# Patient Record
Sex: FEMALE | Race: WHITE | URBAN - METROPOLITAN AREA
[De-identification: names, ages, dates, MRNs, and addresses within clinical notes are randomized per-mention and may not be internally consistent; named-entity substitution may affect disease eponyms.]

---

## 2023-09-30 DIAGNOSIS — F41.9 ANXIETY: ICD-10-CM

## 2023-09-30 PROBLEM — E88.89 CYP2C19 INTERMEDIATE METABOLIZER (MULTI): Status: ACTIVE | Noted: 2022-10-10

## 2023-09-30 PROBLEM — G40.319: Status: ACTIVE | Noted: 2018-12-11

## 2023-09-30 PROBLEM — R46.89 COMPULSIVE BEHAVIOR: Status: ACTIVE | Noted: 2022-10-10

## 2023-09-30 PROBLEM — G40.812 LENNOX-GASTAUT SYNDROME (MULTI): Status: ACTIVE | Noted: 2018-12-11

## 2023-09-30 PROBLEM — F63.9 IMPULSE CONTROL DISORDER: Status: ACTIVE | Noted: 2022-10-10

## 2023-09-30 RX ORDER — DIAZEPAM 5 MG/1
5 TABLET ORAL EVERY 12 HOURS PRN
COMMUNITY
Start: 2022-02-07 | End: 2023-10-07

## 2023-09-30 RX ORDER — RUFINAMIDE 400 MG/1
2 TABLET, FILM COATED ORAL
COMMUNITY
Start: 2022-03-05 | End: 2024-05-10

## 2023-09-30 RX ORDER — ESCITALOPRAM OXALATE 5 MG/1
5 TABLET ORAL 2 TIMES DAILY
COMMUNITY
End: 2024-01-11

## 2023-09-30 RX ORDER — ETHOSUXIMIDE 250 MG/1
500 CAPSULE ORAL 2 TIMES DAILY
COMMUNITY

## 2023-09-30 RX ORDER — PHENYTOIN 50 MG/1
TABLET, CHEWABLE ORAL
COMMUNITY
Start: 2022-03-05

## 2023-09-30 RX ORDER — MIDAZOLAM 5 MG/.1ML
SPRAY NASAL
COMMUNITY
Start: 2021-01-18

## 2023-09-30 SDOH — ECONOMIC STABILITY: INCOME INSECURITY: IN THE LAST 12 MONTHS, WAS THERE A TIME WHEN YOU WERE NOT ABLE TO PAY THE MORTGAGE OR RENT ON TIME?: NO

## 2023-09-30 SDOH — ECONOMIC STABILITY: HOUSING INSECURITY
IN THE LAST 12 MONTHS, WAS THERE A TIME WHEN YOU DID NOT HAVE A STEADY PLACE TO SLEEP OR SLEPT IN A SHELTER (INCLUDING NOW)?: NO

## 2023-09-30 ASSESSMENT — LIFESTYLE VARIABLES
HOW OFTEN DO YOU HAVE SIX OR MORE DRINKS ON ONE OCCASION: NEVER
HOW OFTEN DO YOU HAVE A DRINK CONTAINING ALCOHOL: NEVER
HOW MANY STANDARD DRINKS CONTAINING ALCOHOL DO YOU HAVE ON A TYPICAL DAY: PATIENT DOES NOT DRINK
SKIP TO QUESTIONS 9-10: 1
AUDIT-C TOTAL SCORE: 0

## 2023-10-29 DIAGNOSIS — F79 INTELLECTUAL DISABILITY: ICD-10-CM

## 2023-11-01 ENCOUNTER — APPOINTMENT (OUTPATIENT)
Dept: BEHAVIORAL HEALTH | Facility: CLINIC | Age: 28
End: 2023-11-01
Payer: COMMERCIAL

## 2024-01-11 DIAGNOSIS — R46.89 COMPULSIVE BEHAVIOR: ICD-10-CM

## 2024-01-11 DIAGNOSIS — F41.9 ANXIETY: ICD-10-CM

## 2024-01-11 DIAGNOSIS — F63.9 IMPULSE CONTROL DISORDER: ICD-10-CM

## 2024-01-11 RX ORDER — ESCITALOPRAM OXALATE 5 MG/1
5 TABLET ORAL 2 TIMES DAILY
Qty: 180 TABLET | Refills: 0 | Status: SHIPPED | OUTPATIENT
Start: 2024-01-11 | End: 2024-01-29 | Stop reason: SDUPTHER

## 2024-01-29 DIAGNOSIS — R46.89 COMPULSIVE BEHAVIOR: ICD-10-CM

## 2024-01-29 DIAGNOSIS — F41.9 ANXIETY: ICD-10-CM

## 2024-01-29 DIAGNOSIS — F63.9 IMPULSE CONTROL DISORDER: ICD-10-CM

## 2024-01-29 RX ORDER — ESCITALOPRAM OXALATE 5 MG/1
5 TABLET ORAL 2 TIMES DAILY
Qty: 180 TABLET | Refills: 0 | Status: SHIPPED | OUTPATIENT
Start: 2024-01-29

## 2024-08-09 ENCOUNTER — OFFICE VISIT (OUTPATIENT)
Dept: BEHAVIORAL HEALTH | Facility: CLINIC | Age: 29
End: 2024-08-09
Payer: COMMERCIAL

## 2024-08-09 DIAGNOSIS — F79 INTELLECTUAL DISABILITY: ICD-10-CM

## 2024-08-09 DIAGNOSIS — R46.89 COMPULSIVE BEHAVIOR: ICD-10-CM

## 2024-08-09 DIAGNOSIS — F63.9 IMPULSE CONTROL DISORDER: Primary | ICD-10-CM

## 2024-08-09 DIAGNOSIS — G40.814 INTRACTABLE LENNOX-GASTAUT SYNDROME WITHOUT STATUS EPILEPTICUS (MULTI): ICD-10-CM

## 2024-08-09 DIAGNOSIS — G40.319 GENERALIZED IDIOPATHIC EPILEPSY AND EPILEPTIC SYNDROMES, INTRACTABLE, WITHOUT STATUS EPILEPTICUS (MULTI): ICD-10-CM

## 2024-08-09 DIAGNOSIS — F41.9 ANXIETY: ICD-10-CM

## 2024-08-09 PROCEDURE — 1036F TOBACCO NON-USER: CPT | Performed by: PSYCHIATRY & NEUROLOGY

## 2024-08-09 PROCEDURE — 99214 OFFICE O/P EST MOD 30 MIN: CPT | Performed by: PSYCHIATRY & NEUROLOGY

## 2024-08-09 NOTE — PROGRESS NOTES
Outpatient Adult IDD Psychiatry    A HIPAA-compliant interactive audio and video telecommunication system which permits real time communications between the patient (at the originating site) and provider (at the distant site) was utilized to provide this telehealth service.     The patient, family, caregivers and guardian (as appropriate) have provided consent on this date to conduct treatment via this telehealth service.  The patient's identity and physical location were verified at the time of this visit.      Present for appointment: Danielle and parents.    SUBJECTIVE    No major changes for Danielle in terms of overall health, seizures, or behaviors over the past several months.     She continues to have periods of moodiness/irritability and emotional tantrums.    Parents have not been successful in getting her up to the lab for checking serum escitalopram level -- inquiring about in-home phlebotomy.    Saw neurology (Dr. Garza) on 7/02/24 -- no treatment changes.  Current ASMs are: /50/100, /500, and /800.    Review of Systems   Constitutional:  Negative for activity change, appetite change and fatigue.   Cardiovascular:  Negative for chest pain.   Gastrointestinal:  Negative for abdominal pain.   Genitourinary:  Negative for dysuria and pelvic pain.   Musculoskeletal:  Negative for gait problem.   Neurological:  Positive for seizures. Negative for tremors and syncope.   Psychiatric/Behavioral:  Positive for behavioral problems. Negative for self-injury and sleep disturbance.       Controlled Substance Evaluation  [place]/PDMP reviewed: Skinny Lloyd MD on 8/9/2024 10:17 AM  I have personally reviewed the [place]AR[place] report for Danielle Moreira.     MEDICATION HISTORY  CBD supplement - led to subtherapeutic PHY levels and increased seizures  Clonazepam - for seizures  Depakote - for seizures, caused hair loss  Fluvoxamine - helpful for anxiety and OCD at 25mg per day  Lamotrigine - for  seizures  Keppra - for seizures, contributed to anger/aggression problems  Ketogenic diet - minimal improvement in seizure control  Oxcarbazepine - for seizures  Vimpat - for seizures  Zonisamide - caused abnormal behavior or tactile hallucinations    CURRENT MEDICATIONS    Current Outpatient Medications:     diazePAM (Valium) 5 mg tablet, Take 1 tablet (5 mg) by mouth every 12 hours if needed., Disp: , Rfl:     Dilantin Infatabs 50 mg chewable tablet, TAKE 2 TABLETS BY MOUTH TWICE DAILY AND 1 TABLET EVERY AFTERNOON. FOLLOW TITRATION AS INSTRUCTED TO GOAL DOSE  MG TWICE PER DAY, AND 50 IN THE AFTERNOON., Disp: , Rfl:     escitalopram (Lexapro) 5 mg tablet, Take 1 tablet (5 mg) by mouth 2 times a day., Disp: 180 tablet, Rfl: 0    ethosuximide (Zarontin) 250 mg capsule, Take 2 capsules (500 mg) by mouth 2 times a day., Disp: , Rfl:     nasal spray midazolam (Nayzilam) 5 mg/spray (0.1 mL) spray,non-aerosol, Administer into affected nostril(s)., Disp: , Rfl:     rufinamide (Banzel) 400 mg tablet, Take 2 tablets (800 mg) by mouth 2 times a day with meals., Disp: , Rfl:     SOCIAL HISTORY  Living situation Lives with parents   Provider agency N/A   Work or day program None currently   School N/A   Guardianship Mother (Anaya)   SSA ?   Bx Specialist ?   Nicotine None/never   Alcohol None/never   Other drugs None/never     OBJECTIVE    Lab Results   Component Value Date    HGBA1C 4.5 05/18/2022     Therapeutic Drug Monitoring  07/13/2024: Phenytoin level (total) = 21.9 [10-20]  07/13/2024: Phenytoin level (free) = 1.7 [1.0-2.0]    Electrocardiograms  10/10/2022: SR, VR 85, QTc 452    MENTAL STATUS EXAM  General/Appearance: Appropriate dress/hygiene/grooming. Lying on couch.  Attitude/Behavior: Hostile. Does not want to engage today.  Speech/Communication: Single word responses. Irritable tone.  Motor: No abnormal or involuntary movements observed.  Gait: Not assessed at this visit.  Mood: Appears  upset/irritable.  Affect: Congruent.  Thought processes: [place]. Goal-directed. Organized/coherent.  Thought content: Unable to assess.  Perception: Does not appear to be experiencing or responding to hallucinations.  Sensorium/Cognition: Oriented to self and surroundings. Intellectual impairment.  Memory: Not directly assessed at this time.  Insight: Absent.  Judgment: Poor.    ASSESSMENT  Danielle has responded fairly well to the escitalopram and is not having adverse effects.  Recommend increasing by another 5mg/day to address mood symptoms.  We can revisit the possibility of checking a serum drug level in the future.  Will re-order escitalopram Rxs if parents decide to remain at the increased dose.    PROBLEM LIST  Intellectual developmental disorder, moderate  Anxiety  O/C behaviors  Impulse control disorder  Epilepsy (LGS)    PLAN  -- Increase escitalopram to 10mg in the morning and 5mg at bedtime for anxiety, OCD, and impulsivity  -- Follow up 6 weeks    Skinny Lloyd MD

## 2024-08-10 ASSESSMENT — ENCOUNTER SYMPTOMS
ACTIVITY CHANGE: 0
FATIGUE: 0
SLEEP DISTURBANCE: 0
APPETITE CHANGE: 0
ABDOMINAL PAIN: 0
SEIZURES: 1
TREMORS: 0
DYSURIA: 0

## 2024-08-23 DIAGNOSIS — R46.89 COMPULSIVE BEHAVIOR: ICD-10-CM

## 2024-08-23 DIAGNOSIS — F63.9 IMPULSE CONTROL DISORDER: ICD-10-CM

## 2024-08-23 DIAGNOSIS — F41.9 ANXIETY: ICD-10-CM

## 2024-08-23 RX ORDER — ESCITALOPRAM OXALATE 10 MG/1
10 TABLET ORAL DAILY
Qty: 30 TABLET | Refills: 11 | Status: SHIPPED | OUTPATIENT
Start: 2024-08-23

## 2024-08-23 RX ORDER — ESCITALOPRAM OXALATE 5 MG/1
5 TABLET ORAL DAILY
Qty: 30 TABLET | Refills: 11 | Status: SHIPPED | OUTPATIENT
Start: 2024-08-23

## 2024-09-26 ENCOUNTER — APPOINTMENT (OUTPATIENT)
Dept: BEHAVIORAL HEALTH | Facility: CLINIC | Age: 29
End: 2024-09-26
Payer: COMMERCIAL

## 2024-09-26 DIAGNOSIS — G40.319 GENERALIZED IDIOPATHIC EPILEPSY AND EPILEPTIC SYNDROMES, INTRACTABLE, WITHOUT STATUS EPILEPTICUS (MULTI): ICD-10-CM

## 2024-09-26 DIAGNOSIS — F63.9 IMPULSE CONTROL DISORDER: Primary | ICD-10-CM

## 2024-09-26 DIAGNOSIS — G40.814 INTRACTABLE LENNOX-GASTAUT SYNDROME WITHOUT STATUS EPILEPTICUS (MULTI): ICD-10-CM

## 2024-09-26 DIAGNOSIS — R46.89 COMPULSIVE BEHAVIOR: ICD-10-CM

## 2024-09-26 DIAGNOSIS — F41.9 ANXIETY: ICD-10-CM

## 2024-09-26 DIAGNOSIS — F79 INTELLECTUAL DISABILITY: ICD-10-CM

## 2024-09-26 PROCEDURE — 99213 OFFICE O/P EST LOW 20 MIN: CPT | Performed by: PSYCHIATRY & NEUROLOGY

## 2024-09-26 PROCEDURE — 1036F TOBACCO NON-USER: CPT | Performed by: PSYCHIATRY & NEUROLOGY

## 2024-09-26 ASSESSMENT — ENCOUNTER SYMPTOMS
ABDOMINAL PAIN: 0
ACTIVITY CHANGE: 0
TREMORS: 0
APPETITE CHANGE: 0
SLEEP DISTURBANCE: 0
DYSURIA: 0
FATIGUE: 0
SEIZURES: 1

## 2024-09-26 NOTE — PROGRESS NOTES
Outpatient Adult IDD Psychiatry    A HIPAA-compliant interactive audio and video telecommunication system which permits real time communications between the patient (at the originating site) and provider (at the distant site) was utilized to provide this telehealth service.     The patient, family, caregivers and guardian (as appropriate) have provided consent on this date to conduct treatment via this telehealth service.  The patient's identity and physical location were verified at the time of this visit.      Present for appointment: Danielle and dad (Bk).    SUBJECTIVE    Danielle has generally been doing well over the past month or so.  Parents feel like mood has clearly improved since increasing escitalopram.  She does not seem to be having any side effects (sleep changes or GI problems) on the higher dose.  Due to see neurology (Dr. Garza) on 10/03/24 and will have a prolonged EEG done on 10/28/24.  Current ASMs are: /50/100, /500, and /400/800.  She will have a liver US done due to slightly elevated transaminases.    Review of Systems   Constitutional:  Negative for activity change, appetite change and fatigue.   Cardiovascular:  Negative for chest pain.   Gastrointestinal:  Negative for abdominal pain.   Genitourinary:  Negative for dysuria and pelvic pain.   Musculoskeletal:  Negative for gait problem.   Neurological:  Positive for seizures. Negative for tremors and syncope.   Psychiatric/Behavioral:  Positive for behavioral problems. Negative for self-injury and sleep disturbance.      MEDICATION HISTORY  CBD supplement - led to subtherapeutic PHY levels and increased seizures  Clonazepam - for seizures  Depakote - for seizures, caused hair loss  Fluvoxamine - helpful for anxiety and OCD at 25mg per day  Lamotrigine - for seizures  Keppra - for seizures, contributed to anger/aggression problems  Ketogenic diet - minimal improvement in seizure control  Oxcarbazepine - for  seizures  Vimpat - for seizures  Zonisamide - caused abnormal behavior or tactile hallucinations    CURRENT MEDICATIONS    Current Outpatient Medications:     diazePAM (Valium) 5 mg tablet, Take 1 tablet (5 mg) by mouth every 12 hours if needed., Disp: , Rfl:     Dilantin Infatabs 50 mg chewable tablet, TAKE 2 TABLETS BY MOUTH TWICE DAILY AND 1 TABLET EVERY AFTERNOON. FOLLOW TITRATION AS INSTRUCTED TO GOAL DOSE  MG TWICE PER DAY, AND 50 IN THE AFTERNOON., Disp: , Rfl:     escitalopram (Lexapro) 10 mg tablet, Take 1 tablet (10 mg) by mouth once daily., Disp: 30 tablet, Rfl: 11    escitalopram (Lexapro) 5 mg tablet, Take 1 tablet (5 mg) by mouth once daily., Disp: 30 tablet, Rfl: 11    ethosuximide (Zarontin) 250 mg capsule, Take 2 capsules (500 mg) by mouth 2 times a day., Disp: , Rfl:     nasal spray midazolam (Nayzilam) 5 mg/spray (0.1 mL) spray,non-aerosol, Administer into affected nostril(s)., Disp: , Rfl:     rufinamide (Banzel) 400 mg tablet, Take 2 tablets (800 mg) by mouth 2 times a day with meals., Disp: , Rfl:     SOCIAL HISTORY  Living situation Lives with parents   Provider agency N/A   Work or day program None currently   School N/A   Guardianship Mother (Anaya)   SSA ?   Bx Specialist ?   Nicotine None/never   Alcohol None/never   Other drugs None/never     OBJECTIVE    Lab Results   Component Value Date    HGBA1C 4.5 05/18/2022     Therapeutic Drug Monitoring  09/05/2024: Phenytoin level (total) = 14.7 [10-20]  09/05/2024: Phenytoin level (free) = 1.2 [1.0-2.0]  09/05/2024: Rufinamide level = 4.1 [5-30]  09/05/2024: Ethosuximide level = 53 []    Electrocardiograms  10/10/2022: SR, VR 85, QTc 452    MENTAL STATUS EXAM  General/Appearance: Appropriate dress/hygiene/grooming.  Attitude/Behavior: Calm/pleasant. Cooperative. Shows me her keychain collection.  Speech/Communication: Single word responses.  Motor: No abnormal or involuntary movements observed.  Gait: Ambulates w/o difficulty.  Stable/steady.  Mood: Appears to be in good spirits.  Affect: Euthymic. Full range.  Thought processes: [place]. Goal-directed. Organized/coherent.  Thought content: Unable to assess.  Perception: Does not appear to be experiencing or responding to hallucinations.  Sensorium/Cognition: Oriented to self and surroundings. Intellectual impairment.  Memory: Not directly assessed at this time.  Insight: Absent.  Judgment: Poor.    ASSESSMENT  Danielle has responded well to the increased dose of escitalopram.    PROBLEM LIST  Intellectual developmental disorder, moderate  Anxiety  O/C behaviors  Impulse control disorder  Epilepsy (LGS)    PLAN  -- Continue escitalopram 10mg in the morning and 5mg at bedtime for anxiety, OCD, and impulsivity  -- Follow up TBD    Skinny Lloyd MD

## 2024-10-01 DIAGNOSIS — F63.9 IMPULSE CONTROL DISORDER: ICD-10-CM

## 2024-10-01 DIAGNOSIS — R46.89 COMPULSIVE BEHAVIOR: ICD-10-CM

## 2024-10-01 DIAGNOSIS — F41.9 ANXIETY: ICD-10-CM

## 2024-10-01 RX ORDER — ESCITALOPRAM OXALATE 5 MG/1
5 TABLET ORAL 2 TIMES DAILY
Qty: 180 TABLET | OUTPATIENT
Start: 2024-10-01

## 2025-02-28 DIAGNOSIS — F63.9 IMPULSE CONTROL DISORDER: ICD-10-CM

## 2025-02-28 DIAGNOSIS — R46.89 COMPULSIVE BEHAVIOR: ICD-10-CM

## 2025-02-28 DIAGNOSIS — F41.9 ANXIETY: ICD-10-CM

## 2025-03-01 RX ORDER — DIAZEPAM 10 MG/1
TABLET ORAL
COMMUNITY
Start: 2025-01-07

## 2025-03-01 RX ORDER — ESCITALOPRAM OXALATE 5 MG/1
5 TABLET ORAL 2 TIMES DAILY
Qty: 180 TABLET | OUTPATIENT
Start: 2025-03-01

## 2025-08-03 DIAGNOSIS — R46.89 COMPULSIVE BEHAVIOR: ICD-10-CM

## 2025-08-03 DIAGNOSIS — F41.9 ANXIETY: ICD-10-CM

## 2025-08-03 DIAGNOSIS — F63.9 IMPULSE CONTROL DISORDER: ICD-10-CM

## 2025-08-04 RX ORDER — ESCITALOPRAM OXALATE 10 MG/1
10 TABLET ORAL DAILY
Qty: 30 TABLET | Refills: 11 | Status: SHIPPED | OUTPATIENT
Start: 2025-08-04

## 2025-08-04 RX ORDER — ESCITALOPRAM OXALATE 5 MG/1
5 TABLET ORAL DAILY
Qty: 30 TABLET | Refills: 11 | Status: SHIPPED | OUTPATIENT
Start: 2025-08-04

## 2025-08-04 RX ORDER — BRIVARACETAM 50 MG/1
50 TABLET, FILM COATED ORAL NIGHTLY
COMMUNITY
Start: 2025-05-13